# Patient Record
Sex: FEMALE | Race: WHITE | NOT HISPANIC OR LATINO
[De-identification: names, ages, dates, MRNs, and addresses within clinical notes are randomized per-mention and may not be internally consistent; named-entity substitution may affect disease eponyms.]

---

## 2017-02-23 ENCOUNTER — IMPORTED ENCOUNTER (OUTPATIENT)
Age: 52
End: 2017-02-23

## 2017-02-23 PROCEDURE — 92025 CPTRIZED CORNEAL TOPOGRAPHY: CPT

## 2017-02-23 PROCEDURE — 92012 INTRM OPH EXAM EST PATIENT: CPT

## 2017-04-18 ENCOUNTER — IMPORTED ENCOUNTER (OUTPATIENT)
Age: 52
End: 2017-04-18

## 2017-04-18 PROCEDURE — 65426 REMOVAL OF EYE LESION: CPT

## 2017-04-19 ENCOUNTER — IMPORTED ENCOUNTER (OUTPATIENT)
Age: 52
End: 2017-04-19

## 2017-04-19 PROCEDURE — 99024 POSTOP FOLLOW-UP VISIT: CPT

## 2017-04-26 ENCOUNTER — IMPORTED ENCOUNTER (OUTPATIENT)
Age: 52
End: 2017-04-26

## 2017-04-26 PROCEDURE — 99024 POSTOP FOLLOW-UP VISIT: CPT

## 2017-05-02 ENCOUNTER — IMPORTED ENCOUNTER (OUTPATIENT)
Age: 52
End: 2017-05-02

## 2017-05-02 PROCEDURE — 65426 REMOVAL OF EYE LESION: CPT

## 2017-05-03 ENCOUNTER — IMPORTED ENCOUNTER (OUTPATIENT)
Age: 52
End: 2017-05-03

## 2017-05-03 PROCEDURE — 99024 POSTOP FOLLOW-UP VISIT: CPT

## 2017-05-18 ENCOUNTER — IMPORTED ENCOUNTER (OUTPATIENT)
Age: 52
End: 2017-05-18

## 2017-05-18 PROCEDURE — 99024 POSTOP FOLLOW-UP VISIT: CPT

## 2017-06-13 ENCOUNTER — IMPORTED ENCOUNTER (OUTPATIENT)
Age: 52
End: 2017-06-13

## 2017-06-13 PROCEDURE — 99024 POSTOP FOLLOW-UP VISIT: CPT

## 2017-07-12 ENCOUNTER — IMPORTED ENCOUNTER (OUTPATIENT)
Age: 52
End: 2017-07-12

## 2017-07-12 PROCEDURE — 99024 POSTOP FOLLOW-UP VISIT: CPT

## 2017-10-06 ASSESSMENT — VISUAL ACUITY
OD_CC: 20/25-2
OD_CC: 20/25
OS_CC: 20/80+
OS_CC: 20/25
OD_CC: 20/25
OS_CC: 20/25
OD_CC: 20/
OS_CC: 20/25
OS_PH: 20/25
OD_PH: 20/
OD_CC: 20/
OD_CC: 20/20
OD_CC: 20/30
OS_CC: 20/25
OS_CC: 20/20-1
OS_CC: 20/50

## 2017-10-06 ASSESSMENT — TONOMETRY
OS_IOP_MMHG: 14
OS_IOP_MMHG: 15
OD_IOP_MMHG: 16
OD_IOP_MMHG: 16
OS_IOP_MMHG: 15
OS_IOP_MMHG: 11
OD_IOP_MMHG: 12
OD_IOP_MMHG: 21

## 2017-10-06 ASSESSMENT — KERATOMETRY
OD_K1POWER_DIOPTERS: 42.25
OS_K1POWER_DIOPTERS: 40.50
OS_AXISANGLE2_DEGREES: 83
OS_K2POWER_DIOPTERS: 45.75
OD_AXISANGLE2_DEGREES: 85
OD_K2POWER_DIOPTERS: 44.75

## 2018-01-10 ENCOUNTER — ESTABLISHED (OUTPATIENT)
Age: 53
End: 2018-01-10

## 2018-01-10 DIAGNOSIS — Z98.890: ICD-10-CM

## 2018-01-10 DIAGNOSIS — H52.03: ICD-10-CM

## 2018-01-10 PROCEDURE — 4040F PNEUMOC VAC/ADMIN/RCVD: CPT | Mod: 8P

## 2018-01-10 PROCEDURE — G8427 DOCREV CUR MEDS BY ELIG CLIN: HCPCS

## 2018-01-10 PROCEDURE — 92014 COMPRE OPH EXAM EST PT 1/>: CPT

## 2018-01-10 PROCEDURE — 1036F TOBACCO NON-USER: CPT

## 2018-01-10 PROCEDURE — 92015 DETERMINE REFRACTIVE STATE: CPT

## 2018-01-10 PROCEDURE — G8785 BP SCRN NO PERF AT INTERVAL: HCPCS

## 2018-01-10 ASSESSMENT — VISUAL ACUITY
OD_SC: 20/150-1
OD_CC: J1
OS_SC: 20/200+1
OS_CC: 20/30-1
OD_CC: 20/25-2
OS_CC: J1
OD_SC: 20/400
OS_SC: 20/400

## 2018-01-10 ASSESSMENT — PACHYMETRY
OS_CT_UM: 541
OD_CT_UM: 536

## 2018-01-10 ASSESSMENT — KERATOMETRY
OS_AXISANGLE2_DEGREES: 94
OD_AXISANGLE2_DEGREES: 81
OS_K2POWER_DIOPTERS: 44.75
OS_AXISANGLE_DEGREES: 4
OS_K1POWER_DIOPTERS: 43.00
OD_K1POWER_DIOPTERS: 42.50
OD_AXISANGLE_DEGREES: 171
OD_K2POWER_DIOPTERS: 44.50

## 2018-01-10 ASSESSMENT — TONOMETRY
OD_IOP_MMHG: 13
OS_IOP_MMHG: 12

## 2018-10-11 ENCOUNTER — APPOINTMENT (RX ONLY)
Dept: URBAN - METROPOLITAN AREA CLINIC 38 | Facility: CLINIC | Age: 53
Setting detail: DERMATOLOGY
End: 2018-10-11

## 2018-10-11 DIAGNOSIS — L21.8 OTHER SEBORRHEIC DERMATITIS: ICD-10-CM

## 2018-10-11 DIAGNOSIS — B07.8 OTHER VIRAL WARTS: ICD-10-CM

## 2018-10-11 PROCEDURE — 17110 DESTRUCTION B9 LES UP TO 14: CPT

## 2018-10-11 PROCEDURE — 99202 OFFICE O/P NEW SF 15 MIN: CPT | Mod: 25

## 2018-10-11 RX ORDER — HYDROCORTISONE 25 MG/G
CREAM TOPICAL
Qty: 1 | Refills: 1 | Status: ERX | COMMUNITY
Start: 2018-10-11

## 2018-10-11 RX ADMIN — HYDROCORTISONE: 25 CREAM TOPICAL at 19:17

## 2018-10-11 NOTE — HPI: WARTS (VERRUCA)
Is This A New Presentation, Or A Follow-Up?: Wart
How Severe Are Your Warts?: mild
Additional History: Has not been treated

## 2018-10-25 ENCOUNTER — APPOINTMENT (RX ONLY)
Dept: URBAN - METROPOLITAN AREA CLINIC 38 | Facility: CLINIC | Age: 53
Setting detail: DERMATOLOGY
End: 2018-10-25

## 2018-10-25 DIAGNOSIS — B07.8 OTHER VIRAL WARTS: ICD-10-CM | Status: RESOLVING

## 2018-10-25 PROBLEM — F32.9 MAJOR DEPRESSIVE DISORDER, SINGLE EPISODE, UNSPECIFIED: Status: ACTIVE | Noted: 2018-10-25

## 2018-10-25 PROBLEM — L29.8 OTHER PRURITUS: Status: ACTIVE | Noted: 2018-10-25

## 2018-10-25 PROBLEM — M12.9 ARTHROPATHY, UNSPECIFIED: Status: ACTIVE | Noted: 2018-10-25

## 2018-10-25 PROBLEM — L70.0 ACNE VULGARIS: Status: ACTIVE | Noted: 2018-10-25

## 2018-10-25 PROBLEM — L85.3 XEROSIS CUTIS: Status: ACTIVE | Noted: 2018-10-25

## 2018-10-25 PROBLEM — L23.7 ALLERGIC CONTACT DERMATITIS DUE TO PLANTS, EXCEPT FOOD: Status: ACTIVE | Noted: 2018-10-25

## 2018-10-25 PROCEDURE — 17110 DESTRUCTION B9 LES UP TO 14: CPT

## 2019-02-13 ENCOUNTER — ESTABLISHED (OUTPATIENT)
Age: 54
End: 2019-02-13

## 2019-02-13 DIAGNOSIS — H25.13: ICD-10-CM

## 2019-02-13 DIAGNOSIS — H17.9: ICD-10-CM

## 2019-02-13 DIAGNOSIS — H52.03: ICD-10-CM

## 2019-02-13 PROCEDURE — 92025 CPTRIZED CORNEAL TOPOGRAPHY: CPT

## 2019-02-13 PROCEDURE — 92014 COMPRE OPH EXAM EST PT 1/>: CPT

## 2019-02-13 ASSESSMENT — KERATOMETRY
OD_AXISANGLE2_DEGREES: 84
OS_K1POWER_DIOPTERS: 43
OD_K2POWER_DIOPTERS: 44.75
OS_AXISANGLE_DEGREES: 179
OD_K1POWER_DIOPTERS: 42.5
OS_K2POWER_DIOPTERS: 45.25
OS_AXISANGLE2_DEGREES: 89
OD_AXISANGLE_DEGREES: 174

## 2019-02-13 ASSESSMENT — VISUAL ACUITY
OD_CC: 20/20
OS_CC: 20/20

## 2019-02-13 ASSESSMENT — TONOMETRY
OD_IOP_MMHG: 12
OS_IOP_MMHG: 12

## 2019-08-07 ENCOUNTER — BRIEF FOLLOW-UP (OUTPATIENT)
Age: 54
End: 2019-08-07

## 2019-08-07 DIAGNOSIS — H25.13: ICD-10-CM

## 2019-08-07 PROCEDURE — 92012 INTRM OPH EXAM EST PATIENT: CPT

## 2019-08-07 ASSESSMENT — KERATOMETRY
OS_AXISANGLE_DEGREES: 179
OD_K2POWER_DIOPTERS: 44.75
OS_K1POWER_DIOPTERS: 43
OD_AXISANGLE_DEGREES: 174
OD_K1POWER_DIOPTERS: 42.5
OS_AXISANGLE2_DEGREES: 89
OS_K2POWER_DIOPTERS: 45.25
OD_AXISANGLE2_DEGREES: 84

## 2019-08-07 ASSESSMENT — VISUAL ACUITY
OD_CC: 20/25
OS_CC: 20/20

## 2019-08-07 ASSESSMENT — TONOMETRY
OD_IOP_MMHG: 12
OS_IOP_MMHG: 12

## 2020-06-08 ENCOUNTER — APPOINTMENT (RX ONLY)
Dept: URBAN - METROPOLITAN AREA CLINIC 38 | Facility: CLINIC | Age: 55
Setting detail: DERMATOLOGY
End: 2020-06-08

## 2020-06-08 DIAGNOSIS — L82.1 OTHER SEBORRHEIC KERATOSIS: ICD-10-CM

## 2020-06-08 DIAGNOSIS — L82.0 INFLAMED SEBORRHEIC KERATOSIS: ICD-10-CM

## 2020-06-08 DIAGNOSIS — L21.8 OTHER SEBORRHEIC DERMATITIS: ICD-10-CM | Status: INADEQUATELY CONTROLLED

## 2020-06-08 DIAGNOSIS — D18.0 HEMANGIOMA: ICD-10-CM

## 2020-06-08 DIAGNOSIS — D22 MELANOCYTIC NEVI: ICD-10-CM

## 2020-06-08 PROBLEM — D22.5 MELANOCYTIC NEVI OF TRUNK: Status: ACTIVE | Noted: 2020-06-08

## 2020-06-08 PROBLEM — D18.01 HEMANGIOMA OF SKIN AND SUBCUTANEOUS TISSUE: Status: ACTIVE | Noted: 2020-06-08

## 2020-06-08 PROCEDURE — 99214 OFFICE O/P EST MOD 30 MIN: CPT | Mod: 25

## 2020-06-08 PROCEDURE — ? MEDICATION COUNSELING

## 2020-06-08 PROCEDURE — 17110 DESTRUCTION B9 LES UP TO 14: CPT

## 2020-06-08 PROCEDURE — ? PRESCRIPTION

## 2020-06-08 PROCEDURE — ? COUNSELING

## 2020-06-08 PROCEDURE — ? ADDITIONAL NOTES

## 2020-06-08 PROCEDURE — ? LIQUID NITROGEN

## 2020-06-08 RX ORDER — HYDROCORTISONE 25 MG/G
CREAM TOPICAL
Qty: 30 | Refills: 2 | Status: ERX | COMMUNITY
Start: 2020-06-08

## 2020-06-08 RX ADMIN — HYDROCORTISONE: 25 CREAM TOPICAL at 00:00

## 2020-06-08 ASSESSMENT — LOCATION ZONE DERM
LOCATION ZONE: TRUNK
LOCATION ZONE: FACE

## 2020-06-08 ASSESSMENT — LOCATION DETAILED DESCRIPTION DERM
LOCATION DETAILED: SUPERIOR LUMBAR SPINE
LOCATION DETAILED: LEFT INFERIOR UPPER BACK
LOCATION DETAILED: LEFT MID-UPPER BACK
LOCATION DETAILED: RIGHT INFERIOR LATERAL FOREHEAD

## 2020-06-08 ASSESSMENT — LOCATION SIMPLE DESCRIPTION DERM
LOCATION SIMPLE: RIGHT FOREHEAD
LOCATION SIMPLE: LEFT UPPER BACK
LOCATION SIMPLE: LOWER BACK

## 2020-06-08 NOTE — PROCEDURE: LIQUID NITROGEN
Render Note In Bullet Format When Appropriate: No
Detail Level: Detailed
Post-Care Instructions: I reviewed with the patient in detail post-care instructions. Patient is to wear sunprotection, and avoid picking at any of the treated lesions. Pt may apply Vaseline to crusted or scabbing areas.
Medical Necessity Clause: This procedure was medically necessary because the lesions that were treated were:
Consent: The patient's consent was obtained including but not limited to risks of crusting, scabbing, blistering, scarring, darker or lighter pigmentary change, recurrence, incomplete removal and infection.
Medical Necessity Information: It is in your best interest to select a reason for this procedure from the list below. All of these items fulfill various CMS LCD requirements except the new and changing color options.

## 2020-06-08 NOTE — PROCEDURE: MEDICATION COUNSELING
Xelpreetiz Pregnancy And Lactation Text: This medication is Pregnancy Category D and is not considered safe during pregnancy.  The risk during breast feeding is also uncertain.

## 2020-06-08 NOTE — PROCEDURE: MEDICATION COUNSELING
done by NAIMA Marquez Otezla Pregnancy And Lactation Text: This medication is Pregnancy Category C and it isn't known if it is safe during pregnancy. It is unknown if it is excreted in breast milk.

## 2020-06-08 NOTE — HPI: RASH (SEBORRHEIC DERMATITIS)
How Severe Is It?: moderate
Is This A New Presentation, Or A Follow-Up?: Follow Up Seborrheic Dermatitis
Additional History: Needs refills

## 2020-08-19 ENCOUNTER — 1 YEAR COMPLETE EXAM (OUTPATIENT)
Age: 55
End: 2020-08-19

## 2020-08-19 DIAGNOSIS — Z98.890: ICD-10-CM

## 2020-08-19 DIAGNOSIS — H25.13: ICD-10-CM

## 2020-08-19 DIAGNOSIS — H17.9: ICD-10-CM

## 2020-08-19 PROCEDURE — 92014 COMPRE OPH EXAM EST PT 1/>: CPT

## 2020-08-19 ASSESSMENT — VISUAL ACUITY
OD_CC: 20/25-2
OD_GLARE: 20/50
OS_GLARE: 20/25
OS_CC: 20/20

## 2020-08-19 ASSESSMENT — TONOMETRY
OS_IOP_MMHG: 11
OD_IOP_MMHG: 11

## 2020-08-31 NOTE — PROCEDURE: MEDICATION COUNSELING
Detail Level: Detailed Detail Level: Simple Metronidazole Pregnancy And Lactation Text: This medication is Pregnancy Category B and considered safe during pregnancy.  It is also excreted in breast milk.

## 2020-10-27 ENCOUNTER — DIAGNOSTICS ONLY (OUTPATIENT)
Age: 55
End: 2020-10-27

## 2020-10-27 DIAGNOSIS — H25.11: ICD-10-CM

## 2020-10-27 PROCEDURE — 92136 OPHTHALMIC BIOMETRY: CPT

## 2020-10-27 ASSESSMENT — KERATOMETRY
OD_AXISANGLE2_DEGREES: 82
OS_AXISANGLE2_DEGREES: 89
OS_K2POWER_DIOPTERS: 45.25
OS_K1POWER_DIOPTERS: 43
OD_K2POWER_DIOPTERS: 45
OD_AXISANGLE_DEGREES: 172
OD_K1POWER_DIOPTERS: 42.25
OS_AXISANGLE_DEGREES: 179

## 2020-11-09 ENCOUNTER — SURGERY/PROCEDURE (OUTPATIENT)
Age: 55
End: 2020-11-09

## 2020-11-09 DIAGNOSIS — H52.4: ICD-10-CM

## 2020-11-09 DIAGNOSIS — H52.221: ICD-10-CM

## 2020-11-09 DIAGNOSIS — H25.11: ICD-10-CM

## 2020-11-09 PROCEDURE — V2788 PRESBYOPIA-CORRECT FUNCTION: HCPCS

## 2020-11-09 PROCEDURE — 66984 XCAPSL CTRC RMVL W/O ECP: CPT

## 2020-11-09 ASSESSMENT — KERATOMETRY
OD_K1POWER_DIOPTERS: 42.25
OS_AXISANGLE_DEGREES: 179
OD_AXISANGLE_DEGREES: 172
OD_AXISANGLE2_DEGREES: 82
OD_K2POWER_DIOPTERS: 45
OS_K1POWER_DIOPTERS: 43
OS_AXISANGLE2_DEGREES: 89
OS_K2POWER_DIOPTERS: 45.25

## 2020-11-10 ENCOUNTER — POST-OP CHECK (OUTPATIENT)
Age: 55
End: 2020-11-10

## 2020-11-10 DIAGNOSIS — Z96.1: ICD-10-CM

## 2020-11-10 PROCEDURE — 99024 POSTOP FOLLOW-UP VISIT: CPT

## 2020-11-10 ASSESSMENT — KERATOMETRY
OD_AXISANGLE2_DEGREES: 88
OD_AXISANGLE_DEGREES: 178
OD_K2POWER_DIOPTERS: 45
OD_K1POWER_DIOPTERS: 42

## 2020-11-10 ASSESSMENT — VISUAL ACUITY
OD_SC: J3
OD_SC: 20/30+1

## 2020-11-10 ASSESSMENT — TONOMETRY: OD_IOP_MMHG: 15

## 2020-11-16 ENCOUNTER — POST-OP CHECK (OUTPATIENT)
Age: 55
End: 2020-11-16

## 2020-11-16 DIAGNOSIS — Z96.1: ICD-10-CM

## 2020-11-16 DIAGNOSIS — H25.12: ICD-10-CM

## 2020-11-16 PROCEDURE — 99024 POSTOP FOLLOW-UP VISIT: CPT

## 2020-11-16 PROCEDURE — 92136 OPHTHALMIC BIOMETRY: CPT | Mod: 26

## 2020-11-16 ASSESSMENT — VISUAL ACUITY
OS_GLARE: 20/25
OD_SC: 20/25
OD_SC: J1+
OS_CC: 20/20

## 2020-11-16 ASSESSMENT — TONOMETRY: OD_IOP_MMHG: 10

## 2020-11-16 ASSESSMENT — KERATOMETRY
OD_AXISANGLE_DEGREES: 175
OD_AXISANGLE2_DEGREES: 85
OD_K2POWER_DIOPTERS: 44.75
OD_K1POWER_DIOPTERS: 42.5

## 2020-11-23 ENCOUNTER — SURGERY/PROCEDURE (OUTPATIENT)
Age: 55
End: 2020-11-23

## 2020-11-23 DIAGNOSIS — H52.4: ICD-10-CM

## 2020-11-23 DIAGNOSIS — H25.12: ICD-10-CM

## 2020-11-23 PROCEDURE — 66984 XCAPSL CTRC RMVL W/O ECP: CPT | Mod: 79,LT

## 2020-11-23 PROCEDURE — V2788 PRESBYOPIA-CORRECT FUNCTION: HCPCS

## 2020-11-23 ASSESSMENT — KERATOMETRY
OD_K1POWER_DIOPTERS: 42.5
OD_K2POWER_DIOPTERS: 44.75
OD_AXISANGLE2_DEGREES: 85
OD_AXISANGLE_DEGREES: 175

## 2020-11-24 ENCOUNTER — POST-OP CHECK (OUTPATIENT)
Age: 55
End: 2020-11-24

## 2020-11-24 DIAGNOSIS — Z96.1: ICD-10-CM

## 2020-11-24 DIAGNOSIS — H17.9: ICD-10-CM

## 2020-11-24 PROCEDURE — 92025 CPTRIZED CORNEAL TOPOGRAPHY: CPT

## 2020-11-24 PROCEDURE — 99024 POSTOP FOLLOW-UP VISIT: CPT

## 2020-11-24 ASSESSMENT — TONOMETRY
OS_IOP_MMHG: 12
OD_IOP_MMHG: 13

## 2020-11-24 ASSESSMENT — VISUAL ACUITY
OD_SC: J1+
OU_SC: 20/20
OS_SC: 20/30
OU_SC: J1
OS_SC: J16
OD_SC: 20/25

## 2020-11-30 ENCOUNTER — POST-OP CHECK (OUTPATIENT)
Age: 55
End: 2020-11-30

## 2020-11-30 DIAGNOSIS — Z96.1: ICD-10-CM

## 2020-11-30 PROCEDURE — 99024 POSTOP FOLLOW-UP VISIT: CPT

## 2020-11-30 ASSESSMENT — KERATOMETRY
OD_K1POWER_DIOPTERS: 42.5
OS_K1POWER_DIOPTERS: 42.5
OD_K2POWER_DIOPTERS: 4550
OD_AXISANGLE_DEGREES: 174
OS_K2POWER_DIOPTERS: 45.25
OS_AXISANGLE2_DEGREES: 88
OS_AXISANGLE_DEGREES: 178
OD_AXISANGLE2_DEGREES: 84

## 2020-11-30 ASSESSMENT — VISUAL ACUITY
OS_SC: 20/25
OU_SC: J1+
OD_SC: J1+
OU_SC: 20/25
OD_SC: 20/25
OS_SC: J1+

## 2020-11-30 ASSESSMENT — TONOMETRY
OS_IOP_MMHG: 14
OD_IOP_MMHG: 15

## 2020-12-30 ENCOUNTER — POST-OP CHECK (OUTPATIENT)
Age: 55
End: 2020-12-30

## 2020-12-30 DIAGNOSIS — H26.493: ICD-10-CM

## 2020-12-30 DIAGNOSIS — Z96.1: ICD-10-CM

## 2020-12-30 PROCEDURE — 99024 POSTOP FOLLOW-UP VISIT: CPT

## 2020-12-30 ASSESSMENT — KERATOMETRY
OD_AXISANGLE_DEGREES: 177
OS_AXISANGLE2_DEGREES: 88
OD_K2POWER_DIOPTERS: 45.25
OD_AXISANGLE2_DEGREES: 87
OD_K1POWER_DIOPTERS: 42.50
OS_K1POWER_DIOPTERS: 42.75
OS_AXISANGLE_DEGREES: 178
OS_K2POWER_DIOPTERS: 45.00

## 2020-12-30 ASSESSMENT — VISUAL ACUITY
OU_SC: 20/20
OD_SC: J1+
OD_SC: 20/25+2
OS_SC: J1
OS_SC: 20/25+2

## 2020-12-30 ASSESSMENT — TONOMETRY
OD_IOP_MMHG: 10
OS_IOP_MMHG: 09

## 2021-09-13 ENCOUNTER — APPOINTMENT (RX ONLY)
Dept: URBAN - METROPOLITAN AREA CLINIC 38 | Facility: CLINIC | Age: 56
Setting detail: DERMATOLOGY
End: 2021-09-13

## 2021-09-13 DIAGNOSIS — L57.8 OTHER SKIN CHANGES DUE TO CHRONIC EXPOSURE TO NONIONIZING RADIATION: ICD-10-CM

## 2021-09-13 DIAGNOSIS — L21.8 OTHER SEBORRHEIC DERMATITIS: ICD-10-CM

## 2021-09-13 DIAGNOSIS — D22 MELANOCYTIC NEVI: ICD-10-CM

## 2021-09-13 PROBLEM — D22.5 MELANOCYTIC NEVI OF TRUNK: Status: ACTIVE | Noted: 2021-09-13

## 2021-09-13 PROCEDURE — ? ADDITIONAL NOTES

## 2021-09-13 PROCEDURE — 99214 OFFICE O/P EST MOD 30 MIN: CPT

## 2021-09-13 PROCEDURE — ? COUNSELING

## 2021-09-13 PROCEDURE — ? PRESCRIPTION

## 2021-09-13 PROCEDURE — ? TREATMENT REGIMEN

## 2021-09-13 RX ORDER — HYDROCORTISONE 25 MG/G
CREAM TOPICAL
Qty: 30 | Refills: 1 | Status: ERX | COMMUNITY
Start: 2021-09-13

## 2021-09-13 RX ADMIN — HYDROCORTISONE: 25 CREAM TOPICAL at 00:00

## 2021-09-13 ASSESSMENT — LOCATION DETAILED DESCRIPTION DERM
LOCATION DETAILED: LEFT MEDIAL FRONTAL SCALP
LOCATION DETAILED: RIGHT PROXIMAL DORSAL FOREARM
LOCATION DETAILED: RIGHT SUPERIOR MEDIAL UPPER BACK
LOCATION DETAILED: LEFT DISTAL DORSAL FOREARM

## 2021-09-13 ASSESSMENT — LOCATION SIMPLE DESCRIPTION DERM
LOCATION SIMPLE: LEFT FOREARM
LOCATION SIMPLE: RIGHT FOREARM
LOCATION SIMPLE: RIGHT UPPER BACK
LOCATION SIMPLE: LEFT SCALP

## 2021-09-13 ASSESSMENT — LOCATION ZONE DERM
LOCATION ZONE: SCALP
LOCATION ZONE: ARM
LOCATION ZONE: TRUNK

## 2021-09-13 NOTE — PROCEDURE: MIPS QUALITY
No pertinent past medical history    
Quality 226: Preventive Care And Screening: Tobacco Use: Screening And Cessation Intervention: Patient screened for tobacco use and is an ex/non-smoker
Quality 111:Pneumonia Vaccination Status For Older Adults: Pneumococcal Vaccination not Administered or Previously Received, Reason not Otherwise Specified
Detail Level: Detailed
Quality 130: Documentation Of Current Medications In The Medical Record: Current Medications Documented
Quality 431: Preventive Care And Screening: Unhealthy Alcohol Use - Screening: Patient screened for unhealthy alcohol use using a single question and scores less than 2 times per year
Quality 110: Preventive Care And Screening: Influenza Immunization: Influenza Immunization Administered during Influenza season

## 2021-09-13 NOTE — HPI: EVALUATION OF SKIN LESION(S)
What Type Of Note Output Would You Prefer (Optional)?: Standard Output
Hpi Title: Evaluation of Skin Lesions
How Severe Are Your Spot(S)?: moderate
Have Your Spot(S) Been Treated In The Past?: has not been treated
Additional History: \\n\\nPatient was screened for COVID-19. Patient answered no to coming in contact with someone with COVID-19, has not tested positive for COVID-19, and has not traveled.

## (undated) RX ORDER — KETOROLAC TROMETHAMINE 4 MG/ML: 1 SOLUTION/ DROPS OPHTHALMIC

## (undated) RX ORDER — MOXIFLOXACIN OPHTHALMIC 5 MG/ML: 1 SOLUTION/ DROPS OPHTHALMIC

## (undated) RX ORDER — PREDNISOLONE ACETATE 10 MG/ML: 1 SUSPENSION/ DROPS OPHTHALMIC